# Patient Record
Sex: FEMALE | Race: OTHER | HISPANIC OR LATINO | URBAN - METROPOLITAN AREA
[De-identification: names, ages, dates, MRNs, and addresses within clinical notes are randomized per-mention and may not be internally consistent; named-entity substitution may affect disease eponyms.]

---

## 2019-10-31 ENCOUNTER — EMERGENCY (EMERGENCY)
Facility: HOSPITAL | Age: 44
LOS: 1 days | Discharge: HOME | End: 2019-10-31
Attending: EMERGENCY MEDICINE
Payer: MEDICAID

## 2019-10-31 VITALS
HEART RATE: 63 BPM | TEMPERATURE: 98 F | DIASTOLIC BLOOD PRESSURE: 63 MMHG | OXYGEN SATURATION: 100 % | SYSTOLIC BLOOD PRESSURE: 100 MMHG | WEIGHT: 130.07 LBS | HEIGHT: 63.78 IN | RESPIRATION RATE: 18 BRPM

## 2019-10-31 PROCEDURE — 99283 EMERGENCY DEPT VISIT LOW MDM: CPT

## 2019-10-31 PROCEDURE — T1013: CPT

## 2019-10-31 RX ORDER — ACETAMINOPHEN 500 MG
975 TABLET ORAL ONCE
Refills: 0 | Status: COMPLETED | OUTPATIENT
Start: 2019-10-31 | End: 2019-10-31

## 2019-10-31 RX ORDER — LIDOCAINE 4 G/100G
1 CREAM TOPICAL ONCE
Refills: 0 | Status: COMPLETED | OUTPATIENT
Start: 2019-10-31 | End: 2019-10-31

## 2019-10-31 RX ORDER — METHOCARBAMOL 500 MG/1
2 TABLET, FILM COATED ORAL
Qty: 30 | Refills: 0
Start: 2019-10-31 | End: 2019-11-04

## 2019-10-31 RX ORDER — METHOCARBAMOL 500 MG/1
1500 TABLET, FILM COATED ORAL ONCE
Refills: 0 | Status: COMPLETED | OUTPATIENT
Start: 2019-10-31 | End: 2019-10-31

## 2019-10-31 RX ADMIN — METHOCARBAMOL 1500 MILLIGRAM(S): 500 TABLET, FILM COATED ORAL at 23:22

## 2019-10-31 RX ADMIN — LIDOCAINE 1 PATCH: 4 CREAM TOPICAL at 23:23

## 2019-10-31 RX ADMIN — Medication 975 MILLIGRAM(S): at 23:23

## 2019-10-31 NOTE — ED PROVIDER NOTE - OBJECTIVE STATEMENT
45yo female no pmhx presents to ED for upper back pain x 2 weeks. Pt noting pain to right upper back near scapula, worse with movement and positional change. Pt has been taking naproxen for pain with mild relief as well as applying otc pain patch. 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 No further complaints. Denies f/c, bowel/bladder incontinence, urinary sx, fall, weakness, difficulty ambulating, cp, sob, headache, n/v/d.  : Court

## 2019-10-31 NOTE — ED PROVIDER NOTE - NS ED ROS FT
Gen: denies weakness, malaise/fatigue, fever, chills  Skin: denies rashes, hives  HEENT: denies headaches, LOC, visual changes, congestion  Respiratory: denies cough, dyspnea, FUNES, SOB, wheezing  Cardiovascular: denies chest pain, palpitations, diaphoresis, edema  GI: denies abdominal pain, nausea/vomiting, diarrhea/constipation  : denies dysuria, hematuria, frequency, urgency, hesitancy, incontinence of bowel/bladder  MSK: +Back pain. denies limitation on movement, weakness, joint swelling/redness/warmth  Neuro: denies LOC, syncope, headache, dizziness, vertigo, numbness/tingling

## 2019-10-31 NOTE — ED PROVIDER NOTE - CLINICAL SUMMARY MEDICAL DECISION MAKING FREE TEXT BOX
Pt in ED with right upper back pain. No bowel/bladder incontinence, no trauma, no fall, no step offs on exam. Likely muscular spasm. Will tx symptoms with tylenol, robaxin and lidoderm and instruct pt to f/u PMD. Pt educated on supportive care for back pain/muscle spasm including heating pads.

## 2019-10-31 NOTE — ED PROVIDER NOTE - PATIENT PORTAL LINK FT
You can access the FollowMyHealth Patient Portal offered by [facility] by registering at the following website: [email or web address]. By joining [employer]’s FollowMyHealth portal, you will also be able to view your health information using other applications (apps) compatible with our system.

## 2019-10-31 NOTE — ED PROVIDER NOTE - PHYSICAL EXAMINATION
Const: Awake, alert and oriented. In no acute distress. Well appearing.  HEENT: NC/AT. Moist mucous membranes.  Eyes: No scleral icterus. EOMI.  Neck:. Soft and supple. Full ROM without pain.  Cardiac: Regular rate and regular rhythm. +S1/S2. Peripheral pulses 2+ and symmetric. No LE edema.  Resp: Speaking in full sentences. No evidence of respiratory distress. No wheezes, rales or rhonchi.  Abd: Soft, non-tender, non-distended. Normal bowel sounds in all 4 quadrants. No guarding or rebound.  Back: Spine midline and non-tender. No step offs. No CVAT. TTP right paraspinal thoracic region.   MSK: FROM extremities x 4, no joint tenderness.   Skin: No rashes, abrasions or lacerations.  Neuro: Awake, alert & oriented x 3. Moves all extremities symmetrically. Sensorimotor intact x 4, ambulatory in ED with steady gait.

## 2019-11-01 RX ORDER — IBUPROFEN 200 MG
1 TABLET ORAL
Qty: 21 | Refills: 0
Start: 2019-11-01 | End: 2019-11-07

## 2020-04-07 ENCOUNTER — EMERGENCY (EMERGENCY)
Facility: HOSPITAL | Age: 45
LOS: 1 days | Discharge: HOME | End: 2020-04-07
Attending: EMERGENCY MEDICINE
Payer: MEDICAID

## 2020-04-07 VITALS
TEMPERATURE: 98 F | SYSTOLIC BLOOD PRESSURE: 112 MMHG | HEART RATE: 77 BPM | RESPIRATION RATE: 18 BRPM | OXYGEN SATURATION: 97 % | DIASTOLIC BLOOD PRESSURE: 72 MMHG | WEIGHT: 126.99 LBS | HEIGHT: 63.78 IN

## 2020-04-07 DIAGNOSIS — S02.2XXA FRACTURE OF NASAL BONES, INITIAL ENCOUNTER FOR CLOSED FRACTURE: Chronic | ICD-10-CM

## 2020-04-07 LAB — S PYO DNA THROAT QL NAA+PROBE: SIGNIFICANT CHANGE UP

## 2020-04-07 PROCEDURE — 99283 EMERGENCY DEPT VISIT LOW MDM: CPT

## 2020-04-07 PROCEDURE — 99284 EMERGENCY DEPT VISIT MOD MDM: CPT

## 2020-04-07 PROCEDURE — T1013: CPT

## 2020-04-07 PROCEDURE — 87651 STREP A DNA AMP PROBE: CPT

## 2020-04-07 PROCEDURE — 87798 DETECT AGENT NOS DNA AMP: CPT

## 2020-04-07 RX ORDER — ACETAMINOPHEN 500 MG
975 TABLET ORAL ONCE
Refills: 0 | Status: COMPLETED | OUTPATIENT
Start: 2020-04-07 | End: 2020-04-07

## 2020-04-07 RX ADMIN — Medication 975 MILLIGRAM(S): at 19:22

## 2020-04-07 NOTE — ED PROVIDER NOTE - ATTENDING CONTRIBUTION TO CARE
Julio: I performed a face to face bedside interview with patient regarding history of present illness, review of symptoms and past medical history. I completed an independent physical exam.  I have discussed patient's plan of care with advanced care provider.   I agree with note as stated above including HISTORY OF PRESENT ILLNESS, HIV, PAST MEDICAL/SURGICAL/FAMILY/SOCIAL HISTORY, ALLERGIES AND HOME MEDICATIONS, REVIEW OF SYSTEMS, PHYSICAL EXAM, MEDICAL DECISION MAKING and any PROGRESS NOTES during the time I functioned as the attending physician for this patient  unless otherwise noted. My brief assessment is as follows: 1 week cough, sore throat, fever, myalgias, chest tightness. no vomiting, no meningeal symptoms or urinary complaints. non toxic, with small exudate on throat, left LAD, supple neck, ctab, rrr, full sentences witout retractions, abd benign, neuro intact. strep neg, suspect likely covid, supportive care, self isolation, return precautions.

## 2020-04-07 NOTE — ED PROVIDER NOTE - OBJECTIVE STATEMENT
45 y/o F with viral illness symptoms. PmHx of hypoglycemia in her country [place] presents for 7 day c/o sore throat/HA/ body ache/ chills/ mild chest tightness with mild dry cough. Denies SOB/N/V/D/ abdominal pain. Denies recent travel. 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 Pt took Ibuprofen this am and afternoon.

## 2020-04-07 NOTE — ED PROVIDER NOTE - CARE PLAN
Principal Discharge DX:	Suspected 2019 novel coronavirus infection  Secondary Diagnosis:	Pharyngitis, unspecified etiology

## 2020-04-07 NOTE — ED PROVIDER NOTE - NSFOLLOWUPINSTRUCTIONS_ED_ALL_ED_FT
READ ALL ATTACHED INSTRUCTIONS   -Do NOT return to work/school/public areas x 14 days   -SELF QUARANTINE x14 days  -Avoid contact with others.   -Wash your hands frequently. Disinfect surfaces frequently    SEEK IMMEDIATE MEDICAL CARE IF YOU HAVE ANY OF THE FOLLOWING SYMPTOMS  **If you develop worsening or new symptoms such as shortness of breath, difficulty breathing, chest pain, confusion, severe weakness, or anything concerning to you, please seek immediate medical care or return to the ER .**    Take Tylenol as directed on bottle

## 2020-04-07 NOTE — ED PROVIDER NOTE - PATIENT PORTAL LINK FT
You can access the FollowMyHealth Patient Portal offered by [facility] by registering at the following website: [email or web address]. By joining [employer]’s FollowMyHealth portal, you will also be able to view your health information using other applications (apps) compatible with our system.

## 2020-04-08 PROBLEM — Z78.9 OTHER SPECIFIED HEALTH STATUS: Chronic | Status: ACTIVE | Noted: 2019-10-31

## 2020-07-10 PROBLEM — Z00.00 ENCOUNTER FOR PREVENTIVE HEALTH EXAMINATION: Status: ACTIVE | Noted: 2020-07-10

## 2020-07-22 ENCOUNTER — APPOINTMENT (OUTPATIENT)
Dept: RHEUMATOLOGY | Facility: CLINIC | Age: 45
End: 2020-07-22
Payer: MEDICAID

## 2020-07-22 VITALS
SYSTOLIC BLOOD PRESSURE: 102 MMHG | OXYGEN SATURATION: 99 % | DIASTOLIC BLOOD PRESSURE: 60 MMHG | RESPIRATION RATE: 16 BRPM | HEART RATE: 73 BPM | WEIGHT: 137 LBS

## 2020-07-22 DIAGNOSIS — Z78.9 OTHER SPECIFIED HEALTH STATUS: ICD-10-CM

## 2020-07-22 DIAGNOSIS — Z82.49 FAMILY HISTORY OF ISCHEMIC HEART DISEASE AND OTHER DISEASES OF THE CIRCULATORY SYSTEM: ICD-10-CM

## 2020-07-22 PROCEDURE — 99205 OFFICE O/P NEW HI 60 MIN: CPT

## 2020-07-25 ENCOUNTER — EMERGENCY (EMERGENCY)
Facility: HOSPITAL | Age: 45
LOS: 1 days | Discharge: HOME | End: 2020-07-25
Attending: EMERGENCY MEDICINE
Payer: MEDICAID

## 2020-07-25 VITALS
WEIGHT: 166.01 LBS | TEMPERATURE: 99 F | SYSTOLIC BLOOD PRESSURE: 106 MMHG | RESPIRATION RATE: 20 BRPM | OXYGEN SATURATION: 98 % | HEART RATE: 75 BPM | DIASTOLIC BLOOD PRESSURE: 65 MMHG | HEIGHT: 62 IN

## 2020-07-25 DIAGNOSIS — S02.2XXA FRACTURE OF NASAL BONES, INITIAL ENCOUNTER FOR CLOSED FRACTURE: Chronic | ICD-10-CM

## 2020-07-25 PROCEDURE — T1013: CPT

## 2020-07-25 PROCEDURE — 99284 EMERGENCY DEPT VISIT MOD MDM: CPT

## 2020-07-25 PROCEDURE — 99283 EMERGENCY DEPT VISIT LOW MDM: CPT | Mod: 25

## 2020-07-25 PROCEDURE — 96372 THER/PROPH/DIAG INJ SC/IM: CPT

## 2020-07-25 RX ORDER — KETOROLAC TROMETHAMINE 30 MG/ML
60 SYRINGE (ML) INJECTION ONCE
Refills: 0 | Status: DISCONTINUED | OUTPATIENT
Start: 2020-07-25 | End: 2020-07-25

## 2020-07-25 RX ORDER — METHOCARBAMOL 500 MG/1
1 TABLET, FILM COATED ORAL
Qty: 15 | Refills: 0
Start: 2020-07-25

## 2020-07-25 RX ORDER — KETOROLAC TROMETHAMINE 30 MG/ML
1 SYRINGE (ML) INJECTION
Qty: 10 | Refills: 0
Start: 2020-07-25

## 2020-07-25 RX ORDER — METHOCARBAMOL 500 MG/1
750 TABLET, FILM COATED ORAL ONCE
Refills: 0 | Status: COMPLETED | OUTPATIENT
Start: 2020-07-25 | End: 2020-07-25

## 2020-07-25 RX ORDER — ACETAMINOPHEN 500 MG
975 TABLET ORAL ONCE
Refills: 0 | Status: COMPLETED | OUTPATIENT
Start: 2020-07-25 | End: 2020-07-25

## 2020-07-25 RX ADMIN — Medication 60 MILLIGRAM(S): at 10:11

## 2020-07-25 RX ADMIN — Medication 975 MILLIGRAM(S): at 10:10

## 2020-07-25 RX ADMIN — METHOCARBAMOL 750 MILLIGRAM(S): 500 TABLET, FILM COATED ORAL at 10:12

## 2020-07-25 NOTE — ED STATDOCS - PATIENT PORTAL LINK FT
You can access the FollowMyHealth Patient Portal offered by [facility] by registering at the following website: [email or web address]. By joining [employer]’s FollowMyHealth portal, you will also be able to view your health information using other applications (apps) compatible with our system.

## 2020-07-25 NOTE — ED STATDOCS - ATTENDING CONTRIBUTION TO CARE
neck and pain 3 days, works as , pos lumbar stiffness, has had similar pain in past no n/t/w  took apap few days ago with some relief.  States outpatient physician working up for rheum disease due to multi-joint pain in past pe  tender  lumbar  msk  plan analgesics

## 2020-07-25 NOTE — ED ADULT TRIAGE NOTE - CHIEF COMPLAINT QUOTE
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 Pt is seeing rheumatologist but the pain feels different

## 2020-07-25 NOTE — ED STATDOCS - CARE PROVIDER_API CALL
Magdy East  NEUROSURGERY  [facility] [address]  [place] [address]  Phone: [phone]  Fax: [phone]  Follow Up Time: 4-6 Days

## 2020-07-25 NOTE — ED STATDOCS - OBJECTIVE STATEMENT
Patient presented for evaluation of neck and pain since Wednesday. 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 She took Tylenol at that time without improvement. She reports of past back pain. She took Naproxen 500mg last night without change. She reports of back stiffness. No specific radicular pains reported. She denies of trauma. No associated abdominal pain. No nausea or vomiting. She is currently in midst of rheumatology workup for general hand and joint pains. She has not had spine follow-up in the past.

## 2020-07-25 NOTE — ED STATDOCS - NS ED ROS FT
Review of Systems-  Constitutional: No fever or chills.   Cardiovascular: No orthopnea or chest pain  Pulmonary: No shortness of breath.   GI: No abdominal pain, nausea or vomiting  Musculoskeletal: + back pain   Psychiatric: No anxiety and depression.

## 2020-08-19 ENCOUNTER — APPOINTMENT (OUTPATIENT)
Dept: RHEUMATOLOGY | Facility: CLINIC | Age: 45
End: 2020-08-19
Payer: MEDICAID

## 2020-08-19 VITALS
TEMPERATURE: 98 F | DIASTOLIC BLOOD PRESSURE: 60 MMHG | HEART RATE: 78 BPM | WEIGHT: 136 LBS | OXYGEN SATURATION: 99 % | SYSTOLIC BLOOD PRESSURE: 116 MMHG | RESPIRATION RATE: 17 BRPM

## 2020-08-19 DIAGNOSIS — Z87.39 PERSONAL HISTORY OF OTHER DISEASES OF THE MUSCULOSKELETAL SYSTEM AND CONNECTIVE TISSUE: ICD-10-CM

## 2020-08-19 PROCEDURE — 99214 OFFICE O/P EST MOD 30 MIN: CPT

## 2020-08-19 RX ORDER — HYDROXYCHLOROQUINE SULFATE 200 MG/1
200 TABLET, FILM COATED ORAL TWICE DAILY
Qty: 60 | Refills: 2 | Status: DISCONTINUED | COMMUNITY
Start: 2020-07-22 | End: 2020-08-19

## 2020-08-19 RX ORDER — PREDNISONE 10 MG/1
10 TABLET ORAL
Qty: 37 | Refills: 1 | Status: DISCONTINUED | COMMUNITY
Start: 2020-07-22 | End: 2020-08-19

## 2020-09-01 PROBLEM — Z87.39 HISTORY OF RHEUMATOID ARTHRITIS: Status: RESOLVED | Noted: 2020-07-22 | Resolved: 2020-09-01

## 2020-10-03 ENCOUNTER — OUTPATIENT (OUTPATIENT)
Dept: OUTPATIENT SERVICES | Facility: HOSPITAL | Age: 45
LOS: 1 days | End: 2020-10-03
Payer: MEDICAID

## 2020-10-03 ENCOUNTER — APPOINTMENT (OUTPATIENT)
Dept: RADIOLOGY | Facility: CLINIC | Age: 45
End: 2020-10-03
Payer: MEDICAID

## 2020-10-03 DIAGNOSIS — M62.838 OTHER MUSCLE SPASM: ICD-10-CM

## 2020-10-03 DIAGNOSIS — S02.2XXA FRACTURE OF NASAL BONES, INITIAL ENCOUNTER FOR CLOSED FRACTURE: Chronic | ICD-10-CM

## 2020-10-03 DIAGNOSIS — Z00.00 ENCOUNTER FOR GENERAL ADULT MEDICAL EXAMINATION WITHOUT ABNORMAL FINDINGS: ICD-10-CM

## 2020-10-03 PROCEDURE — 72050 X-RAY EXAM NECK SPINE 4/5VWS: CPT

## 2020-10-03 PROCEDURE — 72070 X-RAY EXAM THORAC SPINE 2VWS: CPT | Mod: 26

## 2020-10-03 PROCEDURE — 72050 X-RAY EXAM NECK SPINE 4/5VWS: CPT | Mod: 26

## 2020-10-03 PROCEDURE — 72070 X-RAY EXAM THORAC SPINE 2VWS: CPT

## 2020-10-28 ENCOUNTER — APPOINTMENT (OUTPATIENT)
Dept: RHEUMATOLOGY | Facility: CLINIC | Age: 45
End: 2020-10-28
Payer: MEDICAID

## 2020-10-28 VITALS
HEART RATE: 74 BPM | DIASTOLIC BLOOD PRESSURE: 54 MMHG | BODY MASS INDEX: 26.9 KG/M2 | HEIGHT: 60 IN | RESPIRATION RATE: 17 BRPM | OXYGEN SATURATION: 99 % | WEIGHT: 137 LBS | TEMPERATURE: 98 F | SYSTOLIC BLOOD PRESSURE: 84 MMHG

## 2020-10-28 PROCEDURE — 99213 OFFICE O/P EST LOW 20 MIN: CPT | Mod: 25

## 2020-10-28 PROCEDURE — 99072 ADDL SUPL MATRL&STAF TM PHE: CPT

## 2020-10-28 RX ORDER — DULOXETINE HYDROCHLORIDE 30 MG/1
30 CAPSULE, DELAYED RELEASE PELLETS ORAL
Qty: 30 | Refills: 2 | Status: DISCONTINUED | COMMUNITY
Start: 2020-08-19 | End: 2020-10-28

## 2020-10-28 RX ORDER — CYCLOBENZAPRINE HYDROCHLORIDE 10 MG/1
10 TABLET, FILM COATED ORAL
Qty: 30 | Refills: 1 | Status: DISCONTINUED | COMMUNITY
Start: 2020-08-19 | End: 2020-10-28

## 2020-11-14 ENCOUNTER — APPOINTMENT (OUTPATIENT)
Dept: MRI IMAGING | Facility: CLINIC | Age: 45
End: 2020-11-14
Payer: MEDICAID

## 2020-11-14 ENCOUNTER — OUTPATIENT (OUTPATIENT)
Dept: OUTPATIENT SERVICES | Facility: HOSPITAL | Age: 45
LOS: 1 days | End: 2020-11-14
Payer: MEDICAID

## 2020-11-14 ENCOUNTER — RESULT REVIEW (OUTPATIENT)
Age: 45
End: 2020-11-14

## 2020-11-14 DIAGNOSIS — S02.2XXA FRACTURE OF NASAL BONES, INITIAL ENCOUNTER FOR CLOSED FRACTURE: Chronic | ICD-10-CM

## 2020-11-14 DIAGNOSIS — M62.838 OTHER MUSCLE SPASM: ICD-10-CM

## 2020-11-14 DIAGNOSIS — M54.6 PAIN IN THORACIC SPINE: ICD-10-CM

## 2020-11-14 PROCEDURE — 72141 MRI NECK SPINE W/O DYE: CPT | Mod: 26

## 2020-11-14 PROCEDURE — 72146 MRI CHEST SPINE W/O DYE: CPT | Mod: 26

## 2020-11-14 PROCEDURE — 72146 MRI CHEST SPINE W/O DYE: CPT

## 2020-11-14 PROCEDURE — 72141 MRI NECK SPINE W/O DYE: CPT

## 2020-11-17 NOTE — ASSESSMENT
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  Inflammatory polyarthritis of hands, ankles, wrists most notably. Pt not improved w/ APAP and OTC NSAIDs. Improved s/p steroid trial, however labs negative at this time for inflammatory markers- will begin SNRI trial and evaluate again.\par Paraspinal cervicothoracic pain/spasm- will need XR which was normal showing only spasm in cervical region\par \par - MRI cervical and thoracic spine\par - tizanidine 4mg q8h prn. Caution for sedation/drowsiness.\par \par RTO 8-12 weeks

## 2020-11-17 NOTE — PHYSICAL EXAM
[General Appearance - Alert] : alert [General Appearance - In No Acute Distress] : in no acute distress [General Appearance - Well Nourished] : well nourished [General Appearance - Well Developed] : well developed [General Appearance - Well-Appearing] : healthy appearing [Sclera] : the sclera and conjunctiva were normal [PERRL With Normal Accommodation] : pupils were equal in size, round, and reactive to light [Extraocular Movements] : extraocular movements were intact [Outer Ear] : the ears and nose were normal in appearance [Hearing Threshold Finger Rub Not Sunflower] : hearing was normal [Examination Of The Oral Cavity] : the lips and gums were normal [Nasal Cavity] : the nasal mucosa and septum were normal [Oropharynx] : the oropharynx was normal [Neck Appearance] : the appearance of the neck was normal [Neck Cervical Mass (___cm)] : no neck mass was observed [Jugular Venous Distention Increased] : there was no jugular-venous distention [Thyroid Diffuse Enlargement] : the thyroid was not enlarged [Thyroid Nodule] : there were no palpable thyroid nodules [Respiration, Rhythm And Depth] : normal respiratory rhythm and effort [Auscultation Breath Sounds / Voice Sounds] : lungs were clear to auscultation bilaterally [Heart Rate And Rhythm] : heart rate was normal and rhythm regular [Heart Sounds] : normal S1 and S2 [Heart Sounds Gallop] : no gallops [Murmurs] : no murmurs [Heart Sounds Pericardial Friction Rub] : no pericardial rub [Full Pulse] : the pedal pulses are present [Edema] : there was no peripheral edema [Bowel Sounds] : normal bowel sounds [Abdomen Soft] : soft [Abdomen Tenderness] : non-tender [Abdomen Mass (___ Cm)] : no abdominal mass palpated [Cervical Lymph Nodes Enlarged Posterior Bilaterally] : posterior cervical [Cervical Lymph Nodes Enlarged Anterior Bilaterally] : anterior cervical [Supraclavicular Lymph Nodes Enlarged Bilaterally] : supraclavicular [No CVA Tenderness] : no ~M costovertebral angle tenderness [No Spinal Tenderness] : no spinal tenderness [Abnormal Walk] : normal gait [Nail Clubbing] : no clubbing  or cyanosis of the fingernails [Motor Tone] : muscle strength and tone were normal [FreeTextEntry1] : \par Hands- tender PIPs, MCPs w/ some MCP swelling B/L\par Wrists- tender B/L R>L a/w swelling, synovitis\par Elbows- normal\par Shoulders- tender anteriorly at GH B/L w/ fullness, tender AC on palpation, neg Crossarm\par Hips- normal\par Knees- tender ROM, no swelling\par Ankles- tender and swollen at ST/TT\par Feet- Tender medial midfoot in instep, no synovitis, but exquisitely tender to palp\par MMT 10/10 proximally/distally bilaterally  [Skin Color & Pigmentation] : normal skin color and pigmentation [Skin Turgor] : normal skin turgor [] : no rash [Skin Lesions] : no skin lesions [Deep Tendon Reflexes (DTR)] : deep tendon reflexes were 2+ and symmetric [Sensation] : the sensory exam was normal to light touch and pinprick [Motor Exam] : the motor exam was normal [No Focal Deficits] : no focal deficits [Oriented To Time, Place, And Person] : oriented to person, place, and time [Impaired Insight] : insight and judgment were intact [Affect] : the affect was normal [Mood] : the mood was normal

## 2020-11-17 NOTE — HISTORY OF PRESENT ILLNESS
[FreeTextEntry1] : CBP and SNRI not helpful\par Still w/ neck pain/spasm, aching in mid back. denies temperature changes, loss of touch/pain, or weakness.\par NSAIDs not also effective\par Discussed changing to another muscle relaxer\par XR w/o significant changes, will need MRI\par HEP and exercises not helpful [de-identified] : \par \par All other ROS negative except as mentioned in HPI.

## 2020-12-07 ENCOUNTER — APPOINTMENT (OUTPATIENT)
Dept: OBGYN | Facility: CLINIC | Age: 45
End: 2020-12-07
Payer: MEDICAID

## 2020-12-07 VITALS
HEIGHT: 66 IN | WEIGHT: 138 LBS | BODY MASS INDEX: 22.18 KG/M2 | DIASTOLIC BLOOD PRESSURE: 70 MMHG | TEMPERATURE: 97.3 F | SYSTOLIC BLOOD PRESSURE: 120 MMHG

## 2020-12-07 DIAGNOSIS — Z01.419 ENCOUNTER FOR GYNECOLOGICAL EXAMINATION (GENERAL) (ROUTINE) W/OUT ABNORMAL FINDINGS: ICD-10-CM

## 2020-12-07 DIAGNOSIS — Z11.4 ENCOUNTER FOR SCREENING FOR HUMAN IMMUNODEFICIENCY VIRUS [HIV]: ICD-10-CM

## 2020-12-07 DIAGNOSIS — Z12.39 ENCOUNTER FOR OTHER SCREENING FOR MALIGNANT NEOPLASM OF BREAST: ICD-10-CM

## 2020-12-07 DIAGNOSIS — Z11.3 ENCOUNTER FOR SCREENING FOR INFECTIONS WITH A PREDOMINANTLY SEXUAL MODE OF TRANSMISSION: ICD-10-CM

## 2020-12-07 DIAGNOSIS — Z12.4 ENCOUNTER FOR SCREENING FOR MALIGNANT NEOPLASM OF CERVIX: ICD-10-CM

## 2020-12-07 DIAGNOSIS — Z83.3 FAMILY HISTORY OF DIABETES MELLITUS: ICD-10-CM

## 2020-12-07 DIAGNOSIS — Z12.11 ENCOUNTER FOR SCREENING FOR MALIGNANT NEOPLASM OF COLON: ICD-10-CM

## 2020-12-07 LAB
BILIRUB UR QL STRIP: NORMAL
COLLECTION METHOD: NORMAL
DATE COLLECTED: NORMAL
GLUCOSE UR-MCNC: NORMAL
HCG UR QL: 0.2 EU/DL
HCG UR QL: NEGATIVE
HEMOCCULT SP1 STL QL: NEGATIVE
HGB UR QL STRIP.AUTO: NORMAL
KETONES UR-MCNC: NORMAL
LEUKOCYTE ESTERASE UR QL STRIP: NORMAL
NITRITE UR QL STRIP: NORMAL
PH UR STRIP: 5.5
PROT UR STRIP-MCNC: NORMAL
QUALITY CONTROL: YES
QUALITY CONTROL: YES
SP GR UR STRIP: >=1.03

## 2020-12-07 PROCEDURE — 81025 URINE PREGNANCY TEST: CPT

## 2020-12-07 PROCEDURE — 36415 COLL VENOUS BLD VENIPUNCTURE: CPT

## 2020-12-07 PROCEDURE — 99204 OFFICE O/P NEW MOD 45 MIN: CPT | Mod: 25

## 2020-12-07 PROCEDURE — 99072 ADDL SUPL MATRL&STAF TM PHE: CPT

## 2020-12-07 PROCEDURE — 99386 PREV VISIT NEW AGE 40-64: CPT

## 2020-12-07 PROCEDURE — 81003 URINALYSIS AUTO W/O SCOPE: CPT | Mod: QW

## 2020-12-07 PROCEDURE — 82270 OCCULT BLOOD FECES: CPT

## 2020-12-07 NOTE — HISTORY OF PRESENT ILLNESS
[Patient reported mammogram was normal] : Patient reported mammogram was normal [Patient reported PAP Smear was normal] : Patient reported PAP Smear was normal [perimenopausal] : perimenopausal [Irregular Cycle Intervals] : are  irregular [Irregular Duration] : has been irregular [N] : Patient does not use contraception [Y] : Positive pregnancy history [Menarche Age: ____] : age at menarche was [unfilled] [Currently Active] : currently active [Men] : men [Vaginal] : vaginal [No] : No [Moderate Bleeding] : moderate bleeding [___ Months] : [unfilled] months [Frequency: Q ___ days] : menstrual periods occur approximately every [unfilled] days [Menstrual Cramps] : menstrual cramps [Mammogramdate] : 4/10/2020 [TextBox_19] : per pt [PapSmeardate] : 2018 [TextBox_31] : per pt [BoneDensityDate] : NA [ColonoscopyDate] : NA [LMPDate] : 11/25/2020 [PGHxTotal] : 2 [[facility]xFullTerm] : 2 [[facility]] : 2 [TextBox_9] : 15 [TextBox_28] : - heavy menses with clots  - day 1-3  [TextBox_6] : vaginal dryness [FreeTextEntry1] : 11/25/2020

## 2020-12-07 NOTE — REVIEW OF SYSTEMS
[Patient Intake Form Reviewed] : Patient intake form was reviewed [Sight Problems] : sight problems [Abdominal Pain] : abdominal pain [Vomiting] : vomiting [Nausea] : nausea [Negative] : Heme/Lymph

## 2020-12-07 NOTE — DISCUSSION/SUMMARY
[FreeTextEntry1] : We thoroughly reviewed the many etiologies of menorrhagia  including hormonal causes as well as structural causes. .  Structural abnormalities including fibroids, polyps  were reviewed.   We will also schedule a pelvic sonogram to evaluate the uterus for fibroids.  She will also have an office hysteroscopy to evaluate her uterine cavity for polyps and at the same time have an endometrial biopsy. Management options including Mirena IUD was discussed . Patient is very interested in this as she has had IUDs oin the past \par \par his was all explained in detail.  The patient was given time to ask any questions and all questions were completely answered.\par \par STD screening today as well \par \par

## 2020-12-08 LAB
BASOPHILS # BLD AUTO: 0.05 K/UL
BASOPHILS NFR BLD AUTO: 0.8 %
EOSINOPHIL # BLD AUTO: 0.09 K/UL
EOSINOPHIL NFR BLD AUTO: 1.4 %
HBV SURFACE AB SER QL: NONREACTIVE
HBV SURFACE AG SER QL: NONREACTIVE
HCT VFR BLD CALC: 38.8 %
HGB BLD-MCNC: 12.2 G/DL
HIV1+2 AB SPEC QL IA.RAPID: NONREACTIVE
IMM GRANULOCYTES NFR BLD AUTO: 0.2 %
LYMPHOCYTES # BLD AUTO: 2.55 K/UL
LYMPHOCYTES NFR BLD AUTO: 40.5 %
MAN DIFF?: NORMAL
MCHC RBC-ENTMCNC: 28.6 PG
MCHC RBC-ENTMCNC: 31.4 GM/DL
MCV RBC AUTO: 91.1 FL
MONOCYTES # BLD AUTO: 0.36 K/UL
MONOCYTES NFR BLD AUTO: 5.7 %
NEUTROPHILS # BLD AUTO: 3.24 K/UL
NEUTROPHILS NFR BLD AUTO: 51.4 %
PLATELET # BLD AUTO: 409 K/UL
RBC # BLD: 4.26 M/UL
RBC # FLD: 13.2 %
T PALLIDUM AB SER QL IA: NEGATIVE
WBC # FLD AUTO: 6.3 K/UL

## 2020-12-09 ENCOUNTER — APPOINTMENT (OUTPATIENT)
Dept: RHEUMATOLOGY | Facility: CLINIC | Age: 45
End: 2020-12-09
Payer: MEDICAID

## 2020-12-09 VITALS
WEIGHT: 138 LBS | OXYGEN SATURATION: 99 % | TEMPERATURE: 97.9 F | RESPIRATION RATE: 17 BRPM | HEART RATE: 79 BPM | BODY MASS INDEX: 22.18 KG/M2 | HEIGHT: 66 IN

## 2020-12-09 DIAGNOSIS — G89.29 PAIN IN THORACIC SPINE: ICD-10-CM

## 2020-12-09 DIAGNOSIS — M54.6 PAIN IN THORACIC SPINE: ICD-10-CM

## 2020-12-09 DIAGNOSIS — M13.0 POLYARTHRITIS, UNSPECIFIED: ICD-10-CM

## 2020-12-09 LAB
C TRACH RRNA SPEC QL NAA+PROBE: NOT DETECTED
HPV HIGH+LOW RISK DNA PNL CVX: NOT DETECTED
N GONORRHOEA RRNA SPEC QL NAA+PROBE: NOT DETECTED
SOURCE AMPLIFICATION: NORMAL

## 2020-12-09 PROCEDURE — 99214 OFFICE O/P EST MOD 30 MIN: CPT

## 2020-12-09 PROCEDURE — 99072 ADDL SUPL MATRL&STAF TM PHE: CPT

## 2020-12-11 LAB — CYTOLOGY CVX/VAG DOC THIN PREP: ABNORMAL

## 2020-12-14 ENCOUNTER — APPOINTMENT (OUTPATIENT)
Dept: OBGYN | Facility: CLINIC | Age: 45
End: 2020-12-14
Payer: MEDICAID

## 2020-12-14 ENCOUNTER — ASOB RESULT (OUTPATIENT)
Age: 45
End: 2020-12-14

## 2020-12-14 VITALS
WEIGHT: 138 LBS | HEIGHT: 66 IN | SYSTOLIC BLOOD PRESSURE: 100 MMHG | BODY MASS INDEX: 22.18 KG/M2 | DIASTOLIC BLOOD PRESSURE: 64 MMHG

## 2020-12-14 DIAGNOSIS — N92.0 EXCESSIVE AND FREQUENT MENSTRUATION WITH REGULAR CYCLE: ICD-10-CM

## 2020-12-14 LAB
HCG UR QL: NEGATIVE
QUALITY CONTROL: YES

## 2020-12-14 PROCEDURE — 76830 TRANSVAGINAL US NON-OB: CPT

## 2020-12-14 PROCEDURE — 81025 URINE PREGNANCY TEST: CPT

## 2020-12-14 PROCEDURE — 99214 OFFICE O/P EST MOD 30 MIN: CPT | Mod: 25

## 2020-12-14 PROCEDURE — 76376 3D RENDER W/INTRP POSTPROCES: CPT | Mod: 59

## 2020-12-14 PROCEDURE — 99072 ADDL SUPL MATRL&STAF TM PHE: CPT

## 2020-12-14 PROCEDURE — 58558Z: CUSTOM

## 2020-12-14 PROCEDURE — 76856 US EXAM PELVIC COMPLETE: CPT | Mod: 59

## 2020-12-14 NOTE — DISCUSSION/SUMMARY
[FreeTextEntry1] : Post procedure instructions given \par call parameters reviewed \par \par Instruction regarding Mirena IUD placement with next menses given

## 2020-12-14 NOTE — HISTORY OF PRESENT ILLNESS
[Patient reported PAP Smear was normal] : Patient reported PAP Smear was normal [HIV Test offered] : HIV Test offered [Syphilis test offered] : Syphilis test offered [Gonorrhea test offered] : Gonorrhea test offered [Chlamydia test offered] : Chlamydia test offered [HPV test offered] : HPV test offered [Hepatitis B test offered] : Hepatitis B test offered [Hepatitis C test offered] : Hepatitis C test offered [Menarche Age ____] : age at menarche was [unfilled] [Definite ___ (Date)] : the last menstrual period was [unfilled] [Excessive Bleeding] : there was excessive bleeding [Abnormal Duration ___ days] : the duration was abnormal lasting [unfilled] days [Frequency: Q ___ days] : occur approximately every [unfilled] days [Irregular Duration] : has been irregular [Condoms] : uses condoms [Monogamous (Male Partner)] : is monogamous with a male partner [Y] : Positive pregnancy history [Localized] : localized [Ultrasound] : ultrasound [Irregular Menstrual Interval] : irregular menstrual interval [Abnormal Quantity] : abnormal quantity [Abnormal Duration] : abnormal duration [Menarche Age: ____] : age at menarche was [unfilled] [Currently Active] : currently active [Men] : men [Vaginal] : vaginal [No] : No [Patient refuses STI testing] : Patient refuses STI testing [Trichomonas test declined] : Trichomonas test declined [TextBox_4] : PT PRESENTS FOR HYSTEROSCOPY  [Mammogramdate] : 04/10/2020 [TextBox_19] : AS PER PT [BreastSonogramDate] : N/A [PapSmeardate] : 12/07/2020 [TextBox_31] : NEG [BoneDensityDate] : N/A [ColonoscopyDate] : N/A [HIVDate] : 12/07/2020 [TextBox_53] : NEG [SyphilisDate] : 12/07/2020 [TextBox_58] : NEG [GonorrheaDate] : 12/07/2020 [TextBox_63] : NEG [ChlamydiaDate] : 12/07/2020 [TextBox_68] : NEG [HPVDate] : 12/07/2020 [TextBox_78] : NEG [HepatitisBDate] : 12/07/2020 [TextBox_83] : NEG [HepatitisCDate] : 12/07/2020 [TextBox_88] : NEG [LMPDate] : 11/25/2020 [PGHxTotal] : 2 [[facility]xFullTerm] : 2 [[facility]xLiving] : 2 [TextBox_7] : LEFT LOWER SIDE [TextBox_27] : 12/14/2020 PELVIC U/S [TextBox_29] : N/A [TextBox_36] : N/A [TextBox_34] : N/A [TextBox_18] : N/A [FreeTextEntry1] : 11/25/2020

## 2020-12-22 ENCOUNTER — NON-APPOINTMENT (OUTPATIENT)
Age: 45
End: 2020-12-22

## 2020-12-22 LAB — CORE LAB BIOPSY: NORMAL

## 2020-12-23 PROBLEM — Z01.419 ENCOUNTER FOR ANNUAL ROUTINE GYNECOLOGICAL EXAMINATION: Status: RESOLVED | Noted: 2020-12-07 | Resolved: 2020-12-23

## 2021-01-05 ENCOUNTER — RX CHANGE (OUTPATIENT)
Age: 46
End: 2021-01-05

## 2021-01-05 RX ORDER — VENLAFAXINE HYDROCHLORIDE 75 MG/1
75 CAPSULE, EXTENDED RELEASE ORAL
Qty: 30 | Refills: 3 | Status: DISCONTINUED | COMMUNITY
Start: 2020-12-09 | End: 2021-01-05

## 2021-01-25 PROBLEM — M54.6 CHRONIC MIDLINE THORACIC BACK PAIN: Status: ACTIVE | Noted: 2020-07-22

## 2021-01-25 PROBLEM — M13.0 POLYARTHRITIS OF HAND: Status: ACTIVE | Noted: 2020-07-22

## 2021-02-22 ENCOUNTER — APPOINTMENT (OUTPATIENT)
Dept: NEUROLOGY | Facility: CLINIC | Age: 46
End: 2021-02-22
Payer: MEDICAID

## 2021-02-22 VITALS
DIASTOLIC BLOOD PRESSURE: 65 MMHG | BODY MASS INDEX: 22.18 KG/M2 | TEMPERATURE: 98.3 F | SYSTOLIC BLOOD PRESSURE: 115 MMHG | HEIGHT: 66 IN | WEIGHT: 138 LBS

## 2021-02-22 DIAGNOSIS — G95.0 SYRINGOMYELIA AND SYRINGOBULBIA: ICD-10-CM

## 2021-02-22 PROCEDURE — 99072 ADDL SUPL MATRL&STAF TM PHE: CPT

## 2021-02-22 PROCEDURE — 99204 OFFICE O/P NEW MOD 45 MIN: CPT

## 2021-02-22 RX ORDER — VENLAFAXINE HYDROCHLORIDE 75 MG/1
75 CAPSULE, EXTENDED RELEASE ORAL
Qty: 90 | Refills: 2 | Status: COMPLETED | COMMUNITY
Start: 2021-01-05 | End: 2021-02-22

## 2021-03-17 ENCOUNTER — APPOINTMENT (OUTPATIENT)
Dept: RHEUMATOLOGY | Facility: CLINIC | Age: 46
End: 2021-03-17
Payer: MEDICAID

## 2021-03-17 VITALS
HEIGHT: 66 IN | RESPIRATION RATE: 17 BRPM | DIASTOLIC BLOOD PRESSURE: 62 MMHG | HEART RATE: 74 BPM | TEMPERATURE: 97.6 F | WEIGHT: 138 LBS | SYSTOLIC BLOOD PRESSURE: 112 MMHG | BODY MASS INDEX: 22.18 KG/M2 | OXYGEN SATURATION: 98 %

## 2021-03-17 DIAGNOSIS — M19.041 PRIMARY OSTEOARTHRITIS, RIGHT HAND: ICD-10-CM

## 2021-03-17 DIAGNOSIS — M75.52 BURSITIS OF LEFT SHOULDER: ICD-10-CM

## 2021-03-17 DIAGNOSIS — M17.0 BILATERAL PRIMARY OSTEOARTHRITIS OF KNEE: ICD-10-CM

## 2021-03-17 DIAGNOSIS — M25.50 PAIN IN UNSPECIFIED JOINT: ICD-10-CM

## 2021-03-17 DIAGNOSIS — M19.042 PRIMARY OSTEOARTHRITIS, RIGHT HAND: ICD-10-CM

## 2021-03-17 DIAGNOSIS — M15.1 HEBERDEN'S NODES (WITH ARTHROPATHY): ICD-10-CM

## 2021-03-17 DIAGNOSIS — M62.838 OTHER MUSCLE SPASM: ICD-10-CM

## 2021-03-17 DIAGNOSIS — K29.50 UNSPECIFIED CHRONIC GASTRITIS W/OUT BLEEDING: ICD-10-CM

## 2021-03-17 PROCEDURE — 99214 OFFICE O/P EST MOD 30 MIN: CPT

## 2021-03-17 PROCEDURE — 99072 ADDL SUPL MATRL&STAF TM PHE: CPT

## 2021-03-17 RX ORDER — MULTIVITAMIN
CAPSULE ORAL
Refills: 0 | Status: DISCONTINUED | COMMUNITY
End: 2021-03-17

## 2021-03-17 RX ORDER — DICLOFENAC SODIUM AND MISOPROSTOL 75; 200 MG/1; UG/1
75-0.2 TABLET, DELAYED RELEASE ORAL
Qty: 60 | Refills: 1 | Status: ACTIVE | COMMUNITY
Start: 2021-03-17 | End: 1900-01-01

## 2021-03-17 RX ORDER — FAMOTIDINE 40 MG/1
40 TABLET, FILM COATED ORAL
Qty: 30 | Refills: 2 | Status: ACTIVE | COMMUNITY
Start: 2021-03-17 | End: 1900-01-01

## 2021-04-13 ENCOUNTER — TRANSCRIPTION ENCOUNTER (OUTPATIENT)
Age: 46
End: 2021-04-13

## 2021-06-30 ENCOUNTER — APPOINTMENT (OUTPATIENT)
Dept: RHEUMATOLOGY | Facility: CLINIC | Age: 46
End: 2021-06-30

## 2021-08-23 ENCOUNTER — APPOINTMENT (OUTPATIENT)
Dept: NEUROLOGY | Facility: CLINIC | Age: 46
End: 2021-08-23

## 2022-02-25 ENCOUNTER — EMERGENCY (EMERGENCY)
Facility: HOSPITAL | Age: 47
LOS: 1 days | Discharge: HOME | End: 2022-02-25
Attending: EMERGENCY MEDICINE
Payer: MEDICAID

## 2022-02-25 VITALS
SYSTOLIC BLOOD PRESSURE: 121 MMHG | RESPIRATION RATE: 18 BRPM | DIASTOLIC BLOOD PRESSURE: 75 MMHG | TEMPERATURE: 98 F | HEIGHT: 62 IN | OXYGEN SATURATION: 99 % | WEIGHT: 136.03 LBS | HEART RATE: 70 BPM

## 2022-02-25 DIAGNOSIS — S02.2XXA FRACTURE OF NASAL BONES, INITIAL ENCOUNTER FOR CLOSED FRACTURE: Chronic | ICD-10-CM

## 2022-02-25 LAB
ALBUMIN SERPL ELPH-MCNC: 4.1 G/DL — SIGNIFICANT CHANGE UP (ref 3.3–5.2)
ALP SERPL-CCNC: 51 U/L — SIGNIFICANT CHANGE UP (ref 40–120)
ALT FLD-CCNC: 39 U/L — HIGH
ANION GAP SERPL CALC-SCNC: 13 MMOL/L — SIGNIFICANT CHANGE UP (ref 5–17)
APPEARANCE UR: CLEAR — SIGNIFICANT CHANGE UP
AST SERPL-CCNC: 29 U/L — SIGNIFICANT CHANGE UP
BACTERIA # UR AUTO: ABNORMAL
BASOPHILS # BLD AUTO: 0.03 K/UL — SIGNIFICANT CHANGE UP (ref 0–0.2)
BASOPHILS NFR BLD AUTO: 0.8 % — SIGNIFICANT CHANGE UP (ref 0–2)
BILIRUB SERPL-MCNC: 0.2 MG/DL — LOW (ref 0.4–2)
BILIRUB UR-MCNC: NEGATIVE — SIGNIFICANT CHANGE UP
BUN SERPL-MCNC: 10.7 MG/DL — SIGNIFICANT CHANGE UP (ref 8–20)
CALCIUM SERPL-MCNC: 8.5 MG/DL — LOW (ref 8.6–10.2)
CHLORIDE SERPL-SCNC: 104 MMOL/L — SIGNIFICANT CHANGE UP (ref 98–107)
CO2 SERPL-SCNC: 20 MMOL/L — LOW (ref 22–29)
COLOR SPEC: YELLOW — SIGNIFICANT CHANGE UP
CREAT SERPL-MCNC: 0.55 MG/DL — SIGNIFICANT CHANGE UP (ref 0.5–1.3)
DIFF PNL FLD: ABNORMAL
EOSINOPHIL # BLD AUTO: 0.07 K/UL — SIGNIFICANT CHANGE UP (ref 0–0.5)
EOSINOPHIL NFR BLD AUTO: 1.8 % — SIGNIFICANT CHANGE UP (ref 0–6)
EPI CELLS # UR: SIGNIFICANT CHANGE UP
GLUCOSE SERPL-MCNC: 88 MG/DL — SIGNIFICANT CHANGE UP (ref 70–99)
GLUCOSE UR QL: NEGATIVE MG/DL — SIGNIFICANT CHANGE UP
HCG SERPL-ACNC: <4 MIU/ML — SIGNIFICANT CHANGE UP
HCT VFR BLD CALC: 35.1 % — SIGNIFICANT CHANGE UP (ref 34.5–45)
HGB BLD-MCNC: 11.1 G/DL — LOW (ref 11.5–15.5)
IMM GRANULOCYTES NFR BLD AUTO: 0.3 % — SIGNIFICANT CHANGE UP (ref 0–1.5)
KETONES UR-MCNC: NEGATIVE — SIGNIFICANT CHANGE UP
LEUKOCYTE ESTERASE UR-ACNC: NEGATIVE — SIGNIFICANT CHANGE UP
LIDOCAIN IGE QN: 25 U/L — SIGNIFICANT CHANGE UP (ref 22–51)
LYMPHOCYTES # BLD AUTO: 1.14 K/UL — SIGNIFICANT CHANGE UP (ref 1–3.3)
LYMPHOCYTES # BLD AUTO: 29.2 % — SIGNIFICANT CHANGE UP (ref 13–44)
MCHC RBC-ENTMCNC: 26.8 PG — LOW (ref 27–34)
MCHC RBC-ENTMCNC: 31.6 GM/DL — LOW (ref 32–36)
MCV RBC AUTO: 84.8 FL — SIGNIFICANT CHANGE UP (ref 80–100)
MONOCYTES # BLD AUTO: 0.52 K/UL — SIGNIFICANT CHANGE UP (ref 0–0.9)
MONOCYTES NFR BLD AUTO: 13.3 % — SIGNIFICANT CHANGE UP (ref 2–14)
NEUTROPHILS # BLD AUTO: 2.13 K/UL — SIGNIFICANT CHANGE UP (ref 1.8–7.4)
NEUTROPHILS NFR BLD AUTO: 54.6 % — SIGNIFICANT CHANGE UP (ref 43–77)
NITRITE UR-MCNC: NEGATIVE — SIGNIFICANT CHANGE UP
PH UR: 6.5 — SIGNIFICANT CHANGE UP (ref 5–8)
PLATELET # BLD AUTO: 344 K/UL — SIGNIFICANT CHANGE UP (ref 150–400)
POTASSIUM SERPL-MCNC: 3.7 MMOL/L — SIGNIFICANT CHANGE UP (ref 3.5–5.3)
POTASSIUM SERPL-SCNC: 3.7 MMOL/L — SIGNIFICANT CHANGE UP (ref 3.5–5.3)
PROT SERPL-MCNC: 7.2 G/DL — SIGNIFICANT CHANGE UP (ref 6.6–8.7)
PROT UR-MCNC: 15 MG/DL
RBC # BLD: 4.14 M/UL — SIGNIFICANT CHANGE UP (ref 3.8–5.2)
RBC # FLD: 15.4 % — HIGH (ref 10.3–14.5)
RBC CASTS # UR COMP ASSIST: SIGNIFICANT CHANGE UP /HPF (ref 0–4)
SODIUM SERPL-SCNC: 137 MMOL/L — SIGNIFICANT CHANGE UP (ref 135–145)
SP GR SPEC: 1.01 — SIGNIFICANT CHANGE UP (ref 1.01–1.02)
UROBILINOGEN FLD QL: NEGATIVE MG/DL — SIGNIFICANT CHANGE UP
WBC # BLD: 3.9 K/UL — SIGNIFICANT CHANGE UP (ref 3.8–10.5)
WBC # FLD AUTO: 3.9 K/UL — SIGNIFICANT CHANGE UP (ref 3.8–10.5)
WBC UR QL: SIGNIFICANT CHANGE UP /HPF (ref 0–5)

## 2022-02-25 PROCEDURE — 80053 COMPREHEN METABOLIC PANEL: CPT

## 2022-02-25 PROCEDURE — 36415 COLL VENOUS BLD VENIPUNCTURE: CPT

## 2022-02-25 PROCEDURE — 83690 ASSAY OF LIPASE: CPT

## 2022-02-25 PROCEDURE — 81001 URINALYSIS AUTO W/SCOPE: CPT

## 2022-02-25 PROCEDURE — 99284 EMERGENCY DEPT VISIT MOD MDM: CPT

## 2022-02-25 PROCEDURE — 96375 TX/PRO/DX INJ NEW DRUG ADDON: CPT

## 2022-02-25 PROCEDURE — 76705 ECHO EXAM OF ABDOMEN: CPT | Mod: 26,RT

## 2022-02-25 PROCEDURE — 84702 CHORIONIC GONADOTROPIN TEST: CPT

## 2022-02-25 PROCEDURE — 96374 THER/PROPH/DIAG INJ IV PUSH: CPT

## 2022-02-25 PROCEDURE — 93005 ELECTROCARDIOGRAM TRACING: CPT

## 2022-02-25 PROCEDURE — 76705 ECHO EXAM OF ABDOMEN: CPT

## 2022-02-25 PROCEDURE — 93010 ELECTROCARDIOGRAM REPORT: CPT

## 2022-02-25 PROCEDURE — 99285 EMERGENCY DEPT VISIT HI MDM: CPT | Mod: 25

## 2022-02-25 PROCEDURE — 85025 COMPLETE CBC W/AUTO DIFF WBC: CPT

## 2022-02-25 RX ORDER — FAMOTIDINE 10 MG/ML
20 INJECTION INTRAVENOUS ONCE
Refills: 0 | Status: COMPLETED | OUTPATIENT
Start: 2022-02-25 | End: 2022-02-25

## 2022-02-25 RX ORDER — PANTOPRAZOLE SODIUM 20 MG/1
40 TABLET, DELAYED RELEASE ORAL ONCE
Refills: 0 | Status: COMPLETED | OUTPATIENT
Start: 2022-02-25 | End: 2022-02-25

## 2022-02-25 RX ORDER — ONDANSETRON 8 MG/1
4 TABLET, FILM COATED ORAL ONCE
Refills: 0 | Status: COMPLETED | OUTPATIENT
Start: 2022-02-25 | End: 2022-02-25

## 2022-02-25 RX ORDER — SODIUM CHLORIDE 9 MG/ML
1000 INJECTION INTRAMUSCULAR; INTRAVENOUS; SUBCUTANEOUS ONCE
Refills: 0 | Status: COMPLETED | OUTPATIENT
Start: 2022-02-25 | End: 2022-02-25

## 2022-02-25 RX ORDER — KETOROLAC TROMETHAMINE 30 MG/ML
30 SYRINGE (ML) INJECTION ONCE
Refills: 0 | Status: DISCONTINUED | OUTPATIENT
Start: 2022-02-25 | End: 2022-02-25

## 2022-02-25 RX ADMIN — ONDANSETRON 4 MILLIGRAM(S): 8 TABLET, FILM COATED ORAL at 12:40

## 2022-02-25 RX ADMIN — PANTOPRAZOLE SODIUM 40 MILLIGRAM(S): 20 TABLET, DELAYED RELEASE ORAL at 12:41

## 2022-02-25 RX ADMIN — FAMOTIDINE 20 MILLIGRAM(S): 10 INJECTION INTRAVENOUS at 12:40

## 2022-02-25 RX ADMIN — Medication 5 MILLILITER(S): at 12:20

## 2022-02-25 RX ADMIN — SODIUM CHLORIDE 1000 MILLILITER(S): 9 INJECTION INTRAMUSCULAR; INTRAVENOUS; SUBCUTANEOUS at 12:41

## 2022-02-25 RX ADMIN — Medication 30 MILLIGRAM(S): at 12:41

## 2022-02-25 NOTE — ED ADULT NURSE NOTE - OBJECTIVE STATEMENT
46 F, nad, alert and oriented x 3, Citizen of [place] speaking and  Mickey at bedside to translate, c/o right abdominal pain, N/V/D onset last night, denies fever, denies sick contacts.

## 2022-02-25 NOTE — ED STATDOCS - CLINICAL SUMMARY MEDICAL DECISION MAKING FREE TEXT BOX
Adult Inpatient Plan of Care  Plan of Care Review  12/17/2018 0655 - No Change by Vanesa Contreras, RN    D: Patient lethargic. VT run at 2100, MD notified. BP continues to be soft overnight. Suicide precautions continue. Patient intermittently voicing frustration over medical interventions. NPO at midnight for procedure.   I/A: Dopamine gtt decreased per MD order, no further VT run overnight. Sitter at bedside overnight to provide close monitoring. Counseling provided on the necessity of performed medical interventions.   P: Continue to monitor patient and alert MD to any changes in patient condition. Continue bedside sitter to provide close patient monitoring for safety.    Patient with abdominal pain, N/V/D, PE significant for epigastric and RUQ TTP. Labs, UA, IV fluids, IV pain medications, US, and re-assess.

## 2022-02-25 NOTE — ED STATDOCS - GASTROINTESTINAL, MLM
abdomen soft, TTP @ RUQ, RLQ, mid-epigastric, and LLQ , and non-distended. Hyperactive Bowel sounds present. No guarding or rebound

## 2022-02-25 NOTE — ED ADULT TRIAGE NOTE - CHIEF COMPLAINT QUOTE
patient c/o abdominal pain for the past 3 days with vomiting and diarrhea, denies any medical problems.

## 2022-02-25 NOTE — ED STATDOCS - ATTENDING CONTRIBUTION TO CARE
I, Magnolia Garcia, performed the initial face to face bedside interview with this patient regarding history of present illness, review of symptoms and relevant past medical, social and family history.  I completed an independent physical examination.  I was the initial provider who evaluated this patient. I have signed out the follow up of any pending tests (i.e. labs, radiological studies) to the ACP.  I have communicated the patient’s plan of care and disposition with the ACP.  The history, relevant review of systems, past medical and surgical history, medical decision making, and physical examination was documented by the scribe in my presence and I attest to the accuracy of the documentation.

## 2022-02-25 NOTE — ED STATDOCS - NSFOLLOWUPINSTRUCTIONS_ED_ALL_ED_FT
Pt will continue with Medication as prescribed    Clear liquid and advance to bland as discussed.   Followup with Gastroenterologist

## 2022-02-25 NOTE — ED ADULT TRIAGE NOTE - NS ED TRIAGE AVPU SCALE
Alert-The patient is alert, awake and responds to voice. The patient is oriented to time, place, and person. The triage nurse is able to obtain subjective information. Post-Care Instructions: I reviewed with the patient in detail post-care instructions. Patient is not to engage in any heavy lifting, exercise, or swimming for the next 14 days. Should the patient develop any fevers, chills, bleeding, severe pain patient will contact the office immediately.

## 2022-02-25 NOTE — ED STATDOCS - PROGRESS NOTE DETAILS
labs not indicative of acute pathology, neg uz , neg US  still uncomfortable meds ordered   will follow Pt moved form intake Room. Pt seen and evaluated by intake Physician. HPI, Physical examination performed by intake Physician . Note reviewed and followup examination performed by me consistent with initial assessment. Agrees with intake Physician plan and tests. Pt has not been medicated at this time and her symptoms are still the same. Pt will be re-evaluated after medication. On reevaluation pt feels a lot better and pain has resolved. Pt will continue with Medication as prescribed,  Clear liquid and advance to bland as discussed.

## 2022-02-25 NOTE — ED STATDOCS - OBJECTIVE STATEMENT
45 y/o female with PMHx of Gastritis presents to ED c/o abdominal pain. Patient reports progressively worsening lower abdominal pain x3 days with associated N/V/D. Patient tried using Pepto Bismol with no relief. Patient reports her mom has a hx of gallstones.     Denies fever, chills, chest pain, shortness of breath, ear pain, throat pain, urinary symptoms, smoking, alcohol use  LNMP: currently on cycle  : Mickey

## 2022-02-25 NOTE — ED ADULT NURSE NOTE - NS ED NURSE LEVEL OF CONSCIOUSNESS MENTAL STATUS
Awake/Alert/Cooperative [Normal Growth] : growth [Normal Development] : developmental [No Elimination Concerns] : elimination [Continue Regimen] : feeding [No Skin Concerns] : skin [Normal Sleep Pattern] : sleep [Anticipatory Guidance Given] : Anticipatory guidance addressed as per the history of present illness section [ Transition] :  transition [ Care] :  care [Nutritional Adequacy] : nutritional adequacy [Parental Well-Being] : parental well-being [Safety] : safety [Hepatitis B In Hospital] : Hepatitis B administered while in the hospital [Parent/Guardian] : Parent/Guardian [Mother] : mother [Father] : father [Parental Concerns Addressed] : Parental concerns addressed [FreeTextEntry1] : Recommend exclusive breastfeeding, 8-12 feedings per day. Mother should continue prenatal vitamins and avoid alcohol. If formula is needed, recommend iron-fortified formulations every 2-3 hrs. When in car, patient should be in rear-facing car seat in back seat. Air dry umbillical stump. Put baby to sleep on back, in own crib with no loose or soft bedding. Limit baby's exposure to others, especially those with fever or unknown vaccine status.\par \par

## 2022-03-23 LAB
A PHAGOCYTOPH IGG TITR SER IF: NORMAL TITER
ALBUMIN SERPL ELPH-MCNC: 4.5 G/DL
ALP BLD-CCNC: 59 U/L
ALT SERPL-CCNC: 15 U/L
ANACR T: NEGATIVE
ANION GAP SERPL CALC-SCNC: 17 MMOL/L
AST SERPL-CCNC: 21 U/L
B BURGDOR AB SER QL IA: NEGATIVE
B BURGDOR AB SER-IMP: NEGATIVE
B BURGDOR IGM PATRN SER IB-IMP: NEGATIVE
B BURGDOR18KD IGG SER QL IB: NORMAL
B BURGDOR23KD IGG SER QL IB: NORMAL
B BURGDOR23KD IGM SER QL IB: NORMAL
B BURGDOR28KD IGG SER QL IB: NORMAL
B BURGDOR30KD IGG SER QL IB: NORMAL
B BURGDOR31KD IGG SER QL IB: NORMAL
B BURGDOR39KD IGG SER QL IB: NORMAL
B BURGDOR39KD IGM SER QL IB: NORMAL
B BURGDOR41KD IGG SER QL IB: PRESENT
B BURGDOR41KD IGM SER QL IB: PRESENT
B BURGDOR45KD IGG SER QL IB: NORMAL
B BURGDOR58KD IGG SER QL IB: NORMAL
B BURGDOR66KD IGG SER QL IB: NORMAL
B BURGDOR93KD IGG SER QL IB: NORMAL
B MICROTI IGG TITR SER: NORMAL TITER
BASOPHILS # BLD AUTO: 0.08 K/UL
BASOPHILS NFR BLD AUTO: 1.1 %
BILIRUB SERPL-MCNC: 0.4 MG/DL
BUN SERPL-MCNC: 13 MG/DL
CALCIUM SERPL-MCNC: 9 MG/DL
CCP AB SER IA-ACNC: <8 UNITS
CHLORIDE SERPL-SCNC: 103 MMOL/L
CO2 SERPL-SCNC: 19 MMOL/L
CREAT SERPL-MCNC: 0.63 MG/DL
CRP SERPL-MCNC: <0.1 MG/DL
E CHAFFEENSIS IGG TITR SER IF: NORMAL TITER
EOSINOPHIL # BLD AUTO: 0.11 K/UL
EOSINOPHIL NFR BLD AUTO: 1.5 %
ERYTHROCYTE [SEDIMENTATION RATE] IN BLOOD BY WESTERGREN METHOD: 10 MM/HR
G6PD SER-CCNC: 12.6 U/G HGB
GLUCOSE SERPL-MCNC: 77 MG/DL
HCT VFR BLD CALC: 40.1 %
HGB BLD-MCNC: 12.6 G/DL
IMM GRANULOCYTES NFR BLD AUTO: 0.3 %
LYMPHOCYTES # BLD AUTO: 2.88 K/UL
LYMPHOCYTES NFR BLD AUTO: 39.9 %
MAN DIFF?: NORMAL
MCHC RBC-ENTMCNC: 28.5 PG
MCHC RBC-ENTMCNC: 31.4 GM/DL
MCV RBC AUTO: 90.7 FL
MONOCYTES # BLD AUTO: 0.52 K/UL
MONOCYTES NFR BLD AUTO: 7.2 %
NEUTROPHILS # BLD AUTO: 3.6 K/UL
NEUTROPHILS NFR BLD AUTO: 50 %
PLATELET # BLD AUTO: 293 K/UL
POTASSIUM SERPL-SCNC: 4.7 MMOL/L
PROT SERPL-MCNC: 7 G/DL
RBC # BLD: 4.42 M/UL
RBC # FLD: 13.8 %
RF+CCP IGG SER-IMP: NEGATIVE
RHEUMATOID FACT SER QL: <10 IU/ML
SODIUM SERPL-SCNC: 139 MMOL/L
URATE SERPL-MCNC: 3.4 MG/DL
WBC # FLD AUTO: 7.21 K/UL

## 2022-04-04 NOTE — ED ADULT TRIAGE NOTE - NS ED NURSE DIRECT TO ROOM YN
53 yo M with PMHx of malignant melanoma with metastases to right axillary lymph nodes, bilateral lungs, and liver with unknown primary lesion and seizure disorder presenting with ascites associated with severe abdominal pain. Patient stated he needed paracentesis multiple times with the last one was on 4/1/22 and had 6L out. Pt has been treated for [place] with [place] [facility] but would like to establish care at [facility] instead. He stated was [place] [facility] [place] and did not receive any pain meds despite being told to. He was supposed to be on a pain regimen of dilaudid, oxycodone and fentanyl patches. He had a paracentesis appt scheduled today with [facility] but was refused because the appt was not found. He stated was told that his immunotherapy would be switched but was not told when or which medications he would be switched to. Patient reported worsening ascites and significant abdominal pain associated with nausea and vomiting. He also complained about constipation. He denied fevers, chills, erythema, hematuria, hematemesis, chest pain, SOB, unilateral weakness, or numbness.     At ED, his VSS, WBC 17.91, Na 128, K 5.4, Cr 1.6 (baseline ~0.8), bedside abdominal u/s showed intraperitoneal fluid. He received IVF, morphine, zofran and ativan at ED.   Yes

## 2022-06-29 ENCOUNTER — EMERGENCY (EMERGENCY)
Facility: HOSPITAL | Age: 47
LOS: 1 days | Discharge: HOME | End: 2022-06-29
Attending: EMERGENCY MEDICINE
Payer: COMMERCIAL

## 2022-06-29 VITALS
HEIGHT: 62 IN | WEIGHT: 130.07 LBS | RESPIRATION RATE: 18 BRPM | TEMPERATURE: 98 F | OXYGEN SATURATION: 98 % | DIASTOLIC BLOOD PRESSURE: 75 MMHG | SYSTOLIC BLOOD PRESSURE: 108 MMHG | HEART RATE: 91 BPM

## 2022-06-29 VITALS
SYSTOLIC BLOOD PRESSURE: 111 MMHG | RESPIRATION RATE: 16 BRPM | HEART RATE: 84 BPM | TEMPERATURE: 98 F | OXYGEN SATURATION: 98 % | DIASTOLIC BLOOD PRESSURE: 71 MMHG

## 2022-06-29 DIAGNOSIS — S02.2XXA FRACTURE OF NASAL BONES, INITIAL ENCOUNTER FOR CLOSED FRACTURE: Chronic | ICD-10-CM

## 2022-06-29 LAB
APPEARANCE UR: ABNORMAL
BACTERIA # UR AUTO: ABNORMAL
BILIRUB UR-MCNC: NEGATIVE — SIGNIFICANT CHANGE UP
COLOR SPEC: ABNORMAL
DIFF PNL FLD: ABNORMAL
EPI CELLS # UR: SIGNIFICANT CHANGE UP
GLUCOSE UR QL: NEGATIVE MG/DL — SIGNIFICANT CHANGE UP
KETONES UR-MCNC: ABNORMAL
LEUKOCYTE ESTERASE UR-ACNC: ABNORMAL
NITRITE UR-MCNC: NEGATIVE — SIGNIFICANT CHANGE UP
PH UR: 6.5 — SIGNIFICANT CHANGE UP (ref 5–8)
PROT UR-MCNC: 100
RBC CASTS # UR COMP ASSIST: >50 /HPF (ref 0–4)
SP GR SPEC: 1.02 — SIGNIFICANT CHANGE UP (ref 1.01–1.02)
UROBILINOGEN FLD QL: NEGATIVE MG/DL — SIGNIFICANT CHANGE UP
WBC UR QL: ABNORMAL /HPF (ref 0–5)

## 2022-06-29 PROCEDURE — 99284 EMERGENCY DEPT VISIT MOD MDM: CPT

## 2022-06-29 PROCEDURE — 81001 URINALYSIS AUTO W/SCOPE: CPT

## 2022-06-29 PROCEDURE — 99283 EMERGENCY DEPT VISIT LOW MDM: CPT

## 2022-06-29 PROCEDURE — 87086 URINE CULTURE/COLONY COUNT: CPT

## 2022-06-29 PROCEDURE — 87186 SC STD MICRODIL/AGAR DIL: CPT

## 2022-06-29 RX ORDER — IBUPROFEN 200 MG
600 TABLET ORAL ONCE
Refills: 0 | Status: COMPLETED | OUTPATIENT
Start: 2022-06-29 | End: 2022-06-29

## 2022-06-29 RX ORDER — CEPHALEXIN 500 MG
500 CAPSULE ORAL ONCE
Refills: 0 | Status: COMPLETED | OUTPATIENT
Start: 2022-06-29 | End: 2022-06-29

## 2022-06-29 RX ORDER — CEPHALEXIN 500 MG
1 CAPSULE ORAL
Qty: 28 | Refills: 0
Start: 2022-06-29 | End: 2022-07-05

## 2022-06-29 RX ADMIN — Medication 600 MILLIGRAM(S): at 05:30

## 2022-06-29 RX ADMIN — Medication 600 MILLIGRAM(S): at 04:55

## 2022-06-29 RX ADMIN — Medication 500 MILLIGRAM(S): at 05:35

## 2022-06-29 NOTE — ED PROVIDER NOTE - OBJECTIVE STATEMENT
pt is a 45 y/o female presenting to the ed for evaluation. pt states she has been experiencing dysuria pt is a 45 y/o female presenting to the ed for evaluation. pt states she has been experiencing dysuria and hematuria for the past day abd pain. pt denies vaginal discharge   pt denies cp sob fever nausea vomiting numbness or loss of sensation back pain neck pain visual changes

## 2022-06-29 NOTE — ED PROVIDER NOTE - PHYSICAL EXAMINATION

## 2022-06-29 NOTE — ED ADULT NURSE NOTE - OBJECTIVE STATEMENT
Pt c/o BL hip pain and lower abdominal pain with hematuria starting last night. Denies N/V. Denies fever or chills. Urine specimen sent to lab; will continue to monitor.

## 2022-06-29 NOTE — ED PROVIDER NOTE - PATIENT PORTAL LINK FT
You can access the FollowMyHealth Patient Portal offered by [facility] by registering at the following website: [email or web address]. By joining [employer]’s FollowMyHealth portal, you will also be able to view your health information using other applications (apps) compatible with our system.

## 2022-06-29 NOTE — ED ADULT TRIAGE NOTE - CHIEF COMPLAINT QUOTE
pt c/o abdominal pain and burning and painful urination, +blood in urine, denies N/V , no past medical hx

## 2022-09-01 NOTE — ED ADULT NURSE NOTE - NSFALLRSKASSESSDT_ED_ALL_ED
Nutrition Education  Provided heart failure diet education. Education included low sodium diet guidelines (3-4 gm Na+/day) and fluid restriction (64 oz/day). Reviewed foods to choose and foods to avoid, along with label reading and ways to add flavor to food. Pt does not currently follow a low sodium diet at home. Pt states understanding of education. Time spent with patient: 10 minutes. Learners: Patient  Readiness: Acceptance  Method: Explanation and Handout  Response: Verbalizes Understanding  Contact name and number provided.     Dany Wall RD  Contact Number: [phone] 25-Feb-2022 10:30

## 2023-02-14 NOTE — ED STATDOCS - PATIENT PORTAL LINK FT
[facility] OTOLARYNGOLOGY/ENT  Ms. Argelia Patton is a pleasant 78-year-old female that was referred by Dr. Li Zacarias due to problems with nonpulsatile tinnitus. Patient reports that she has had this for several years but reports over the last 6 months has gotten more intense in sound. She reports that the tinnitus is bilateral and she describes this as a constant mid tone. She denies any pulsations or any issues with headaches or dizziness. Audiology studies were completed today and reviewed with the patient. Patient was noted with normal hearing bilaterally with no deficit. Tympanogram was type A bilaterally. Patient was noted with a recent MRI of the brain with and without due to headaches that were occurring with the tinnitus. This was noted to be unremarkable. Patient admits to having a sister with premature hearing loss. She also admits to having some mild to moderate noise exposures in the past.        Allergies: Patient has no known allergies. Current Outpatient Medications   Medication Sig Dispense Refill    pantoprazole (PROTONIX) 40 MG tablet TAKE ONE TABLET BY MOUTH DAILY. 90 tablet 1    albuterol sulfate HFA (VENTOLIN HFA) 108 (90 Base) MCG/ACT inhaler Inhale 2 puffs into the lungs 4 times daily as needed for Wheezing 1 Inhaler 0    PARoxetine (PAXIL) 30 MG tablet Take 1 tablet by mouth nightly Indications: DEPRESSION 90 tablet 3    Cholecalciferol (VITAMIN D) 2000 units CAPS capsule Take 2,000 Units by mouth       No current facility-administered medications for this visit.        Past Surgical History:   Procedure Laterality Date    APPENDECTOMY      CHOLECYSTECTOMY      COLONOSCOPY      GASTRIC BYPASS SURGERY      HYSTERECTOMY (CERVIX STATUS UNKNOWN)      COMPLETE    HYSTERECTOMY, TOTAL ABDOMINAL (CERVIX REMOVED)  2013    JOINT REPLACEMENT Bilateral     TKR    TOTAL HIP ARTHROPLASTY Right 5/31/2017    HIP TOTAL ARTHROPLASTY ANTERIOR APPROACH performed by Tyson Santos MD at [facility]       No past medical history on file. Family History   Problem Relation Age of Onset    High Blood Pressure Mother     Diabetes Mother     Thyroid Disease Maternal Grandmother        Social History     Tobacco Use    Smoking status: Never    Smokeless tobacco: Never   Substance Use Topics    Alcohol use: No           REVIEW OF SYSTEMS:  all other systems reviewed and are negative  Review of Systems   Constitutional:  Negative for chills and fever. HENT:  Positive for tinnitus. Negative for congestion, dental problem, ear discharge, ear pain, facial swelling, hearing loss, nosebleeds, postnasal drip, rhinorrhea, sinus pressure, sinus pain, sneezing, sore throat, trouble swallowing and voice change. Eyes:  Negative for pain and discharge. Neurological:  Negative for dizziness and headaches. Comments:     PHYSICAL EXAM:    /[birth year]   Ht 5' 3.5\" (1.613 m)   Wt 157 lb (71.2 kg)   LMP 01/16/2002   BMI 27.38 kg/m²   Body mass index is 27.38 kg/m². General Appearance: well developed  and well nourished  Head/ Face: normocephalic and atraumatic  Vocal Quality: good/ normal  Ears: Right Ear: External: external ears normal Otoscopy Ear Canal: canal clear Otoscopy TM: TM's normal and TM's mobile Left Ear: External: external ears normal Otoscopy Ear Canal: canal clear Otoscopy TM: TM's normal and TM's mobile  Hearing: Rinne A>B: Right, Rinne A<B: Left, and Weaver R  Nose: nares normal and septum midline  Neck: supple and adenopathy none palpable  Thyroid: normal  Oral exam demonstrated the tongue to be midline with no masses to the posterior pharynx. Patient was noted with no evidence of carotid bruits to auscultation bilaterally. Pupils were equal round reactive to light accommodation with extraocular muscles intact bilaterally. No lateral nystagmus was noted.     Assessment & Plan:    Problem List Items Addressed This Visit       Bilateral nonpulsatile tinnitus - Primary     Bilateral nonpulsatile tinnitus with normal hearing screening  Plan: I have recommended a trial of Lipo flavonoid 3 times a day for total 3 months. I have also discussed the need for utilizing a white noise machine or a fan or radio as background noise specifically at night for sleep. She is to follow-up in 1 year for repeat audiology studies. She was reminded to call if she has questions or if her symptoms worsen. No orders of the defined types were placed in this encounter. No orders of the defined types were placed in this encounter. Electronically signed by Bridgette Handley PA-C on 2/14/23 at 12:18 PM CST        Please note that this chart was generated using dragon dictation software. Although every effort was made to ensure the accuracy of this automated transcription, some errors in transcription may have occurred. You can access the FollowMyHealth Patient Portal offered by [facility] by registering at the following website: [email or web address]. By joining [employer]’s FollowMyHealth portal, you will also be able to view your health information using other applications (apps) compatible with our system.

## 2023-02-28 NOTE — ED ADULT TRIAGE NOTE - LANGUAGE ASSISTANCE NEEDED
Continues to be happy with [employer] monthly and [employer] QOD. No changes today.   
Yes-Patient/Caregiver accepts free interpretation services...

## 2023-12-05 ENCOUNTER — EMERGENCY (EMERGENCY)
Facility: HOSPITAL | Age: 48
LOS: 1 days | Discharge: HOME | End: 2023-12-05
Attending: EMERGENCY MEDICINE | Admitting: EMERGENCY MEDICINE
Payer: COMMERCIAL

## 2023-12-05 VITALS
RESPIRATION RATE: 18 BRPM | OXYGEN SATURATION: 97 % | HEART RATE: 74 BPM | TEMPERATURE: 98 F | DIASTOLIC BLOOD PRESSURE: 78 MMHG | WEIGHT: 145.95 LBS | SYSTOLIC BLOOD PRESSURE: 117 MMHG | HEIGHT: 63 IN

## 2023-12-05 DIAGNOSIS — S02.2XXA FRACTURE OF NASAL BONES, INITIAL ENCOUNTER FOR CLOSED FRACTURE: Chronic | ICD-10-CM

## 2023-12-05 PROCEDURE — 71046 X-RAY EXAM CHEST 2 VIEWS: CPT | Mod: 26

## 2023-12-05 PROCEDURE — 99283 EMERGENCY DEPT VISIT LOW MDM: CPT | Mod: 25

## 2023-12-05 PROCEDURE — 99284 EMERGENCY DEPT VISIT MOD MDM: CPT

## 2023-12-05 PROCEDURE — 96372 THER/PROPH/DIAG INJ SC/IM: CPT

## 2023-12-05 PROCEDURE — 71046 X-RAY EXAM CHEST 2 VIEWS: CPT

## 2023-12-05 RX ORDER — KETOROLAC TROMETHAMINE 30 MG/ML
60 SYRINGE (ML) INJECTION ONCE
Refills: 0 | Status: DISCONTINUED | OUTPATIENT
Start: 2023-12-05 | End: 2023-12-05

## 2023-12-05 RX ORDER — DIAZEPAM 5 MG
5 TABLET ORAL ONCE
Refills: 0 | Status: DISCONTINUED | OUTPATIENT
Start: 2023-12-05 | End: 2023-12-05

## 2023-12-05 RX ORDER — CYCLOBENZAPRINE HYDROCHLORIDE 10 MG/1
1 TABLET, FILM COATED ORAL
Qty: 15 | Refills: 0
Start: 2023-12-05 | End: 2023-12-09

## 2023-12-05 RX ORDER — ACETAMINOPHEN 500 MG
975 TABLET ORAL ONCE
Refills: 0 | Status: COMPLETED | OUTPATIENT
Start: 2023-12-05 | End: 2023-12-05

## 2023-12-05 RX ADMIN — Medication 975 MILLIGRAM(S): at 12:35

## 2023-12-05 RX ADMIN — Medication 975 MILLIGRAM(S): at 13:37

## 2023-12-05 RX ADMIN — Medication 60 MILLIGRAM(S): at 13:37

## 2023-12-05 RX ADMIN — Medication 60 MILLIGRAM(S): at 12:35

## 2023-12-05 RX ADMIN — Medication 5 MILLIGRAM(S): at 12:35

## 2023-12-05 NOTE — ED ADULT TRIAGE NOTE - CHIEF COMPLAINT QUOTE
pt  A&Ox4 BIBA with c/o left side rib pain. atraumatic, denies falls. has been sick with cold sx x2 weeks

## 2023-12-05 NOTE — ED ADULT NURSE NOTE - OBJECTIVE STATEMENT
Pt reports to the ED c/o of L sided rib pain. pt a&ox4 states she had UR symptoms the past 2 weeks with cough and congestion. As of today 1 hr PTA pt states the pain to L side of rib came on. Pt admits to pain being worse with movement and when she coughs/ takes deep breath. denies back pain, CP, sob. RR wnl.

## 2023-12-05 NOTE — ED PROVIDER NOTE - NSFOLLOWUPINSTRUCTIONS_ED_ALL_ED_FT
-- You should update your primary care physician on your Emergency Department visit and follow up with them.  If you do not have a physician or have difficulty following up, please call: [phone] to obtain a [facility] doctor or specialist who can provide follow up.    -- Take ibuprofen 600 mg every 6 hours with food, as needed for pain    -- Return to the ER for worsening or persistent symptoms, and/or ANY NEW OR CONCERNING SYMPTOMS.    Calambres y espasmos musculares  Muscle Cramps and Spasms  Los calambres y espasmos musculares se producen cuando un músculo o varios músculos se tensionan, y usted no lo puede controlar (contracción muscular involuntaria). Son un problema frecuente que puede aparecer en cualquier músculo. El lugar más frecuente es en los músculos de la pantorrilla. Los calambres y espasmos musculares tienen algunas diferencias:  Los calambres musculares son dolorosos. Pueden aparecer y desaparecer, y pueden durar unos segundos o hasta 15 minutos. Los calambres musculares a menudo son más marnie y weaver más que los espasmos musculares.  Los espasmos musculares pueden o no ser dolorosos. Pueden durar solo unos segundos o mucho más tiempo.  Ciertas afecciones, diana la diabetes o la enfermedad de Parkinson, pueden hacer que sea más propenso a tener calambres o espasmos. Stu en la mayoría de los casos, los calambres y espasmos no son consecuencia de otras afecciones. Las causas más frecuentes incluyen las siguientes:  Ejercitarse en exceso. Jal es cuando se hace más trabajo físico o actividad física de lo que el cuerpo soporta.  Uso excesivo por hacer los mismos movimientos demasiadas veces.  Permanecer en kahlil posición rani mucho tiempo.  Preparación, estado o técnica inadecuados al practicar un deporte o hacer kahlil actividad.  No tener la cantidad suficiente de agua u otros líquidos en el organismo (deshidratación).  Algunas otras causas son las siguientes:  Lesiones.  Efectos secundarios de algunos medicamentos.  Muy pocas sales y minerales en el cuerpo (electrolitos), diana potasio y calcio. Jal puede ocurrir si usted juli diuréticos o si está embarazada.  En muchos casos, se desconoce la causa de los calambres o espasmos musculares.    Siga estas instrucciones en garza casa:  Comida y bebida    Margot suficiente líquido para mantener el pis (orina) de color amarillo pálido. Jal puede ayudar a prevenir calambres o espasmos.  Consuma kahlil dieta saludable que incluya muchos nutrientes para ayudar al funcionamiento de los músculos. Kahlil dieta saludable incluye frutas y verduras, proteínas magras, cereales integrales y productos lácteos descremados o semidescremados.  Control del dolor y la rigidez    Bag of ice on a towel on the skin.  A heating pad for use on the affected area.  Intente masajear, estirar y relajar el músculo afectado. Hágalo rani algunos minutos cada vez.  Si se lo indican, aplique hielo en los músculos. Jal puede ayudar si después de un calambre o espasmo tiene dolor o sensibilidad.  Ponga el hielo en kahlil bolsa plástica.  Coloque kahlil toalla entre la piel y la bolsa.  Aplique el hielo rani 20 minutos, 2 a 3 veces por día.  Si se lo indican, aplique calor en los músculos tensos o tirantes, con la frecuencia que le haya indicado el médico. Use la marilou de calor que el médico le recomiende, diana kahlil compresa de calor húmedo o kahlil almohadilla térmica.  Coloque kahlil toalla entre la piel y la marilou de calor.  Aplique calor rani 20 a 30 minutos.  Si la piel se le pone de color jones brillante, retire el hielo o el calor de inmediato para evitar daños en la piel. El riesgo de daño es mayor si no puede sentir dolor, calor o frío.  [place] duchas o citlali con [place] para ayudar a relajar los músculos tensos.  Instrucciones generales    Si tiene calambres con frecuencia, evite el ejercicio intenso rani algunos días.  Use los medicamentos de venta kailey y los recetados solamente diana se lo haya indicado el médico.  Controle si hay algún cambio en flower síntomas.  Comuníquese con un médico si:  Los calambres o espasmos son más intensos u ocurren con más frecuencia.  Flower calambres o espasmos no mejoran con el tiempo.  Esta información no tiene diana fin reemplazar el consejo del médico. Asegúrese de hacerle al médico cualquier pregunta que tenga. -- You should update your primary care physician on your Emergency Department visit and follow up with them.  If you do not have a physician or have difficulty following up, please call: [phone] to obtain a [facility] doctor or specialist who can provide follow up.    -- Take ibuprofen 600 mg every 6 hours with food, as needed for pain    -- Return to the ER for worsening or persistent symptoms, and/or ANY NEW OR CONCERNING SYMPTOMS.    Calambres y espasmos musculares  Muscle Cramps and Spasms  Los calambres y espasmos musculares se producen cuando un músculo o varios músculos se tensionan, y usted no lo puede controlar (contracción muscular involuntaria). Son un problema frecuente que puede aparecer en cualquier músculo. El lugar más frecuente es en los músculos de la pantorrilla. Los calambres y espasmos musculares tienen algunas diferencias:  Los calambres musculares son dolorosos. Pueden aparecer y desaparecer, y pueden durar unos segundos o hasta 15 minutos. Los calambres musculares a menudo son más marnie y weaver más que los espasmos musculares.  Los espasmos musculares pueden o no ser dolorosos. Pueden durar solo unos segundos o mucho más tiempo.  Ciertas afecciones, diana la diabetes o la enfermedad de Parkinson, pueden hacer que sea más propenso a tener calambres o espasmos. Stu en la mayoría de los casos, los calambres y espasmos no son consecuencia de otras afecciones. Las causas más frecuentes incluyen las siguientes:  Ejercitarse en exceso. [place] es cuando se hace más trabajo físico o actividad física de lo que el cuerpo soporta.  Uso excesivo por hacer los mismos movimientos demasiadas veces.  Permanecer en kahlil posición rani mucho tiempo.  Preparación, estado o técnica inadecuados al practicar un deporte o hacer kahlil actividad.  No tener la cantidad suficiente de agua u otros líquidos en el organismo (deshidratación).  Algunas otras causas son las siguientes:  Lesiones.  Efectos secundarios de algunos medicamentos.  Muy pocas sales y minerales en el cuerpo (electrolitos), diana potasio y calcio. [place] puede ocurrir si usted juli diuréticos o si está embarazada.  En muchos casos, se desconoce la causa de los calambres o espasmos musculares.    Siga estas instrucciones en garza casa:  Comida y bebida    Margot suficiente líquido para mantener el pis (orina) de color amarillo pálido. [place] puede ayudar a prevenir calambres o espasmos.  Consuma kahlil dieta saludable que incluya muchos nutrientes para ayudar al funcionamiento de los músculos. Kahlil dieta saludable incluye frutas y verduras, proteínas magras, cereales integrales y productos lácteos descremados o semidescremados.  Control del dolor y la rigidez    Bag of ice on a towel on the skin.  A heating pad for use on the affected area.  Intente masajear, estirar y relajar el músculo afectado. Hágalo rani algunos minutos cada vez.  Si se lo indican, aplique hielo en los músculos. [place] puede ayudar si después de un calambre o espasmo tiene dolor o sensibilidad.  Ponga el hielo en kahlil bolsa plástica.  Coloque kahlil toalla entre la piel y la bolsa.  Aplique el hielo rani 20 minutos, 2 a 3 veces por día.  Si se lo indican, aplique calor en los músculos tensos o tirantes, con la frecuencia que le haya indicado el médico. Use la marilou de calor que el médico le recomiende, diana kahlil compresa de calor húmedo o kahlil almohadilla térmica.  Coloque kahlil toalla entre la piel y la amrilou de calor.  Aplique calor rani 20 a 30 minutos.  Si la piel se le pone de color jones brillante, retire el hielo o el calor de inmediato para evitar daños en la piel. El riesgo de daño es mayor si no puede sentir dolor, calor o frío.  [place] duchas o citlali con [place] para ayudar a relajar los músculos tensos.  Instrucciones generales    Si tiene calambres con frecuencia, evite el ejercicio intenso rani algunos días.  Use los medicamentos de venta kailey y los recetados solamente diana se lo haya indicado el médico.  Controle si hay algún cambio en flower síntomas.  Comuníquese con un médico si:  Los calambres o espasmos son más intensos u ocurren con más frecuencia.  Flower calambres o espasmos no mejoran con el tiempo.  Esta información no tiene diana fin reemplazar el consejo del médico. Asegúrese de hacerle al médico cualquier pregunta que tenga.

## 2023-12-05 NOTE — ED ADULT NURSE NOTE - NSFALLUNIVINTERV_ED_ALL_ED
Bed/Stretcher in lowest position, wheels locked, appropriate side rails in place/Call bell, personal items and telephone in reach/Instruct patient to call for assistance before getting out of bed/chair/stretcher/Non-slip footwear applied when patient is off stretcher/[place] to call system/Physically safe environment - no spills, clutter or unnecessary equipment/Purposeful proactive rounding/Room/bathroom lighting operational, light cord in reach Bed/Stretcher in lowest position, wheels locked, appropriate side rails in place/Call bell, personal items and telephone in reach/Instruct patient to call for assistance before getting out of bed/chair/stretcher/Non-slip footwear applied when patient is off stretcher/[place] to call system/Physically safe environment - no spills, clutter or unnecessary equipment/Purposeful proactive rounding/Room/bathroom lighting operational, light cord in reach

## 2023-12-05 NOTE — ED PROVIDER NOTE - PHYSICAL EXAMINATION
Gen: alert, NAD  HEENT:  NC/AT, PERR  CV:  well perfused  Pulm:  normal RR, breathing comfortably  Abd: s/nt/nd  MSK: moving all extremities  Neuro:  non-focal  Skin:  visualized areas intact, skin of the L chest (site of pain) clear  Psych: AOx3

## 2023-12-05 NOTE — ED PROVIDER NOTE - PATIENT PORTAL LINK FT
You can access the FollowMyHealth Patient Portal offered by [facility] by registering at the following website: [email or web address]. By joining [employer]’s FollowMyHealth portal, you will also be able to view your health information using other applications (apps) compatible with our system. You can access the FollowMyHealth Patient Portal offered by [facility] by registering at the following website: [email or web address]. By joining [employer]’s FollowMyHealth portal, you will also be able to view your health information using other applications (apps) compatible with our system.

## 2023-12-05 NOTE — ED PROVIDER NOTE - OBJECTIVE STATEMENT
pt with cough and congestion for the past 2 weeks, today about 1 hr ago had sudden onset of L mid rib pain that is worse with movement and coughing and deep breath, denies any sob. no medical problems.